# Patient Record
Sex: MALE | Race: WHITE | NOT HISPANIC OR LATINO
[De-identification: names, ages, dates, MRNs, and addresses within clinical notes are randomized per-mention and may not be internally consistent; named-entity substitution may affect disease eponyms.]

---

## 2021-09-28 ENCOUNTER — NON-APPOINTMENT (OUTPATIENT)
Age: 36
End: 2021-09-28

## 2021-09-29 PROBLEM — Z00.00 ENCOUNTER FOR PREVENTIVE HEALTH EXAMINATION: Status: ACTIVE | Noted: 2021-09-29

## 2021-09-30 ENCOUNTER — APPOINTMENT (OUTPATIENT)
Dept: OTOLARYNGOLOGY | Facility: CLINIC | Age: 36
End: 2021-09-30
Payer: COMMERCIAL

## 2021-09-30 VITALS
WEIGHT: 178 LBS | DIASTOLIC BLOOD PRESSURE: 72 MMHG | SYSTOLIC BLOOD PRESSURE: 117 MMHG | HEIGHT: 74 IN | OXYGEN SATURATION: 97 % | TEMPERATURE: 97.8 F | HEART RATE: 72 BPM | BODY MASS INDEX: 22.84 KG/M2

## 2021-09-30 DIAGNOSIS — Z78.9 OTHER SPECIFIED HEALTH STATUS: ICD-10-CM

## 2021-09-30 DIAGNOSIS — I10 ESSENTIAL (PRIMARY) HYPERTENSION: ICD-10-CM

## 2021-09-30 PROCEDURE — 99204 OFFICE O/P NEW MOD 45 MIN: CPT | Mod: 25

## 2021-09-30 PROCEDURE — 31231 NASAL ENDOSCOPY DX: CPT

## 2021-09-30 RX ORDER — LISINOPRIL 10 MG/1
10 TABLET ORAL
Refills: 0 | Status: ACTIVE | COMMUNITY

## 2021-09-30 NOTE — PROCEDURE
[FreeTextEntry6] : We discussed the elevated risk of liberation of viral particles from the nasopharynx if the patient were to be asymptomatically infected with COVID-19; after weighing the risks & benefits the decision was made to proceed. The procedure was performed while wearing appropriate PPE and a camera attached to a video system was used to maximize separation from the patient. The scope was handled appropriately. \par Indication: requirement for exam not possible via anterior rhinoscopy; chronic nasal obstruction\par After verbal consent and the administration of a small amount of an aerosolized phenylephrine/lidocaine mix, examination was performed with a flexible endoscope. Findings:\par Septum: bilat MCS & S-shaped septum\par Mucosa: normal\par Polyposis: not present\par Inferior turbinates: obstructive bilat\par Middle and superior turbinates: normal\par Inferior meatus: unremarkable\par Middle meatus: unremarkable\par Superior meatus: unremarkable\par Speno-ethmoidal recess: unremarkable\par Nasopharynx: unremarkable\par Secretions: unremarkable\par Other findings: none

## 2021-09-30 NOTE — PHYSICAL EXAM
[Nasal Endoscopy Performed] : nasal endoscopy was performed, see procedure section for findings [] : septum deviated bilaterally [de-identified] : R>L pinched midvault; small hump; bilat pos modified Ivan [Normal] : no rashes

## 2021-09-30 NOTE — HISTORY OF PRESENT ILLNESS
[de-identified] : L=R difficulty breathing through the nose for his entire life; hasn't tried any meds for this lately (distant trials of unkn. nasal sprays). Occas sinus infections but not in several years. \par Spring & fall allergies for which he uses OTC antihistamines as needed.

## 2021-09-30 NOTE — ASSESSMENT
[FreeTextEntry1] : Discussed options including turbs, septo/turbs and septo/turbs with either open or closed  grafts; he will consider this during a trial of fluticasone. RTC 1 month

## 2021-09-30 NOTE — REASON FOR VISIT
[Initial Consultation] : an initial consultation for [FreeTextEntry2] : difficulty breathing through the nose

## 2021-09-30 NOTE — CONSULT LETTER
[Dear  ___] : Dear  [unfilled], [Consult Letter:] : I had the pleasure of evaluating your patient, [unfilled]. [Please see my note below.] : Please see my note below. [Consult Closing:] : Thank you very much for allowing me to participate in the care of this patient.  If you have any questions, please do not hesitate to contact me. [Sincerely,] : Sincerely, [FreeTextEntry3] : YOSSI Lopez Jr, MD, FAAOHNS\par Otolaryngologist\par [facility]

## 2021-10-14 ENCOUNTER — TRANSCRIPTION ENCOUNTER (OUTPATIENT)
Age: 36
End: 2021-10-14

## 2021-10-28 ENCOUNTER — APPOINTMENT (OUTPATIENT)
Dept: OTOLARYNGOLOGY | Facility: CLINIC | Age: 36
End: 2021-10-28
Payer: COMMERCIAL

## 2021-10-28 VITALS
TEMPERATURE: 97.8 F | OXYGEN SATURATION: 99 % | BODY MASS INDEX: 22.84 KG/M2 | DIASTOLIC BLOOD PRESSURE: 73 MMHG | HEART RATE: 69 BPM | WEIGHT: 178 LBS | HEIGHT: 74 IN | SYSTOLIC BLOOD PRESSURE: 113 MMHG

## 2021-10-28 PROCEDURE — 99214 OFFICE O/P EST MOD 30 MIN: CPT

## 2021-10-28 NOTE — HISTORY OF PRESENT ILLNESS
[de-identified] : L=R difficulty breathing through the nose for his entire life; hasn't tried any meds for this lately (distant trials of unkn. nasal sprays). Occas sinus infections but not in several years. His breathing is mildly improved on fluticasone. \par Spring & fall allergies for which he uses OTC antihistamines as needed.

## 2021-10-28 NOTE — PHYSICAL EXAM
[] : septum deviated bilaterally [Normal] : no rashes [de-identified] : bilat pinched midvault; small hump; bilat pos modified Ivan

## 2021-10-28 NOTE — ASSESSMENT
[FreeTextEntry1] : We discussed the R&B of various surgical options and settled on a septo/turbs/bilat closed  grafts; he will let us know when he can schedule this. RTC preop.

## 2022-02-24 ENCOUNTER — APPOINTMENT (OUTPATIENT)
Dept: OTOLARYNGOLOGY | Facility: CLINIC | Age: 37
End: 2022-02-24
Payer: COMMERCIAL

## 2022-02-24 VITALS
HEART RATE: 68 BPM | OXYGEN SATURATION: 97 % | WEIGHT: 180 LBS | BODY MASS INDEX: 23.1 KG/M2 | SYSTOLIC BLOOD PRESSURE: 124 MMHG | DIASTOLIC BLOOD PRESSURE: 82 MMHG | TEMPERATURE: 98.2 F | HEIGHT: 74 IN

## 2022-02-24 PROCEDURE — 99215 OFFICE O/P EST HI 40 MIN: CPT

## 2022-02-26 RX ORDER — HYDROCODONE BITARTRATE AND ACETAMINOPHEN 5; 325 MG/1; MG/1
5-325 TABLET ORAL
Qty: 15 | Refills: 0 | Status: ACTIVE | COMMUNITY
Start: 2022-02-26 | End: 1900-01-01

## 2022-02-26 RX ORDER — BACITRACIN 500 [IU]/G
500 OINTMENT TOPICAL
Qty: 1 | Refills: 0 | Status: ACTIVE | COMMUNITY
Start: 2022-02-26 | End: 1900-01-01

## 2022-02-26 NOTE — PHYSICAL EXAM
[] : septum deviated bilaterally [de-identified] : bilat pinched midvault; small hump; bilat pos modified Ivan [Normal] : assessment of respiratory effort is normal

## 2022-02-26 NOTE — ASSESSMENT
[FreeTextEntry1] : Pics taken. The plan will be for bilateral closed  grafts, septo and turbs. \par Fully discussed perioperative care and the risks and benefits of septorhinoplasty, including but not limited to cosmetic deformity, the need for more surgery, septal perforation, nosebleed, loss of sense of smell, poor scarring, infection, and a saddle nose deformity. The functional nature of the procedure was again reviewed. The patient expressed understanding and desires to proceed. An educational perioperative care handout was given.\par [facility] Reference #: [id number]

## 2022-02-26 NOTE — HISTORY OF PRESENT ILLNESS
[de-identified] : L=R difficulty breathing through the nose for his entire life; hasn't tried any meds for this lately (distant trials of unkn. nasal sprays). Occas sinus infections but not in several years. His breathing is mildly improved on fluticasone. \par Spring & fall allergies for which he uses OTC antihistamines as needed. \par He now returns to finalize plans & preop for bilat nasal valve stx repair and for pics.

## 2022-03-28 ENCOUNTER — APPOINTMENT (OUTPATIENT)
Dept: OTOLARYNGOLOGY | Facility: CLINIC | Age: 37
End: 2022-03-28
Payer: COMMERCIAL

## 2022-03-28 PROCEDURE — 99024 POSTOP FOLLOW-UP VISIT: CPT

## 2022-03-28 RX ORDER — BACITRACIN 500 [IU]/G
500 OINTMENT TOPICAL
Qty: 1 | Refills: 0 | Status: ACTIVE | COMMUNITY
Start: 2022-03-28 | End: 1900-01-01

## 2022-03-28 RX ORDER — HYDROCODONE BITARTRATE AND ACETAMINOPHEN 5; 325 MG/1; MG/1
5-325 TABLET ORAL
Qty: 15 | Refills: 0 | Status: ACTIVE | COMMUNITY
Start: 2022-03-28 | End: 1900-01-01

## 2022-03-28 NOTE — ASSESSMENT
[FreeTextEntry1] : Fully discussed perioperative care and the risks and benefits of septorhinoplasty, including but not limited to cosmetic deformity, the need for more surgery, septal perforation, nosebleed, loss of sense of smell, poor scarring, infection, and a saddle nose deformity. The functional nature of the procedure was again reviewed. The patient expressed understanding and desires to proceed. An educational perioperative care handout was given.\par [facility] Reference #: [id number]

## 2022-03-28 NOTE — HISTORY OF PRESENT ILLNESS
[Other Location: e.g. School (Enter Location, City,State)___] : at [unfilled], at the time of the visit. [Medical Office: ([facility])___] : at the medical office located in  [Verbal consent obtained from patient] : the patient, [unfilled] [de-identified] : L=R difficulty breathing through the nose for his entire life; not much effect from a month of fluticasone. Occas sinus infections but not in several years. He now preops for septo/turbs/bilat closed spreaders\par Spring & fall allergies for which he uses OTC antihistamines as needed.

## 2022-03-28 NOTE — PHYSICAL EXAM
[de-identified] : Exam limited d/t lack of in-person appearance\par Constitutional: alert & oriented\par Face/head: normocephalic & atraumatic\par Ears & nose: normal external appearance.\par Neck: no visible lymphadenopathy or masses. Normal apparent range of motion. \par Eyes: no nystagmus or scleral icterus\par Neuro: CN 2-12 grossly intact\par Skin: exposed head & neck skin unremarkable..\par Resp: no dyspnea or coughing\par Psych: normal affect and appropriate linear thinking

## 2022-03-28 NOTE — REASON FOR VISIT
[Pre-Surgical Visit: ____] : pre-surgical visit for [unfilled] [FreeTextEntry2] : preop septo/turbs/bilat closed spreaders

## 2022-04-04 ENCOUNTER — LABORATORY RESULT (OUTPATIENT)
Age: 37
End: 2022-04-04

## 2022-04-11 ENCOUNTER — LABORATORY RESULT (OUTPATIENT)
Age: 37
End: 2022-04-11

## 2022-04-13 ENCOUNTER — TRANSCRIPTION ENCOUNTER (OUTPATIENT)
Age: 37
End: 2022-04-13

## 2022-04-13 ENCOUNTER — APPOINTMENT (OUTPATIENT)
Age: 37
End: 2022-04-13
Payer: COMMERCIAL

## 2022-04-13 PROCEDURE — 30140 RESECT INFERIOR TURBINATE: CPT | Mod: 50

## 2022-04-13 PROCEDURE — 30520 REPAIR OF NASAL SEPTUM: CPT

## 2022-04-13 PROCEDURE — 30465 REPAIR NASAL STENOSIS: CPT

## 2022-04-21 ENCOUNTER — APPOINTMENT (OUTPATIENT)
Dept: OTOLARYNGOLOGY | Facility: CLINIC | Age: 37
End: 2022-04-21
Payer: COMMERCIAL

## 2022-04-21 VITALS
OXYGEN SATURATION: 98 % | TEMPERATURE: 97.7 F | SYSTOLIC BLOOD PRESSURE: 112 MMHG | HEART RATE: 67 BPM | DIASTOLIC BLOOD PRESSURE: 72 MMHG

## 2022-04-21 VITALS — HEIGHT: 74 IN | WEIGHT: 180 LBS | BODY MASS INDEX: 23.1 KG/M2

## 2022-04-21 VITALS — BODY MASS INDEX: 23.1 KG/M2 | HEIGHT: 74 IN | WEIGHT: 180 LBS

## 2022-04-21 PROCEDURE — 99024 POSTOP FOLLOW-UP VISIT: CPT

## 2022-04-21 NOTE — HISTORY OF PRESENT ILLNESS
[de-identified] : s/p septo/turbs/bilat closed spreaders 4/13/21; doing very well w/ already improved breathing.

## 2022-04-21 NOTE — PHYSICAL EXAM
[Normal] : external appearance is normal [de-identified] : debrided; some anterior sloughing removed from the R IT. Septum midline, intact.

## 2022-04-21 NOTE — REASON FOR VISIT
[Post-Operative Visit] : a post-operative visit [FreeTextEntry2] : s/p septo/turbs/bilat closed spreaders

## 2022-05-02 ENCOUNTER — RX RENEWAL (OUTPATIENT)
Age: 37
End: 2022-05-02

## 2022-05-02 RX ORDER — FLUTICASONE PROPIONATE 50 UG/1
50 SPRAY, METERED NASAL
Qty: 1 | Refills: 1 | Status: ACTIVE | COMMUNITY
Start: 2021-09-30 | End: 1900-01-01

## 2022-05-05 ENCOUNTER — APPOINTMENT (OUTPATIENT)
Dept: OTOLARYNGOLOGY | Facility: CLINIC | Age: 37
End: 2022-05-05
Payer: COMMERCIAL

## 2022-05-05 VITALS
HEART RATE: 75 BPM | BODY MASS INDEX: 23.1 KG/M2 | DIASTOLIC BLOOD PRESSURE: 72 MMHG | TEMPERATURE: 97.9 F | OXYGEN SATURATION: 98 % | SYSTOLIC BLOOD PRESSURE: 152 MMHG | HEIGHT: 74 IN | WEIGHT: 180 LBS

## 2022-05-05 DIAGNOSIS — J34.89 OTHER SPECIFIED DISORDERS OF NOSE AND NASAL SINUSES: ICD-10-CM

## 2022-05-05 PROCEDURE — 99024 POSTOP FOLLOW-UP VISIT: CPT

## 2022-05-05 NOTE — PHYSICAL EXAM
[Normal] : no abnormal secretions [de-identified] : debrided; crusting over the R IT. Midline intact septum

## 2022-05-05 NOTE — HISTORY OF PRESENT ILLNESS
[de-identified] : s/p septo/turbs/bilat closed spreaders 4/13/21; doing very well w/ excellent breathing.

## 2022-05-11 ENCOUNTER — NON-APPOINTMENT (OUTPATIENT)
Age: 37
End: 2022-05-11

## 2022-05-19 ENCOUNTER — APPOINTMENT (OUTPATIENT)
Dept: OTOLARYNGOLOGY | Facility: CLINIC | Age: 37
End: 2022-05-19
Payer: COMMERCIAL

## 2022-05-19 VITALS
DIASTOLIC BLOOD PRESSURE: 67 MMHG | SYSTOLIC BLOOD PRESSURE: 114 MMHG | BODY MASS INDEX: 33.13 KG/M2 | WEIGHT: 180 LBS | OXYGEN SATURATION: 97 % | HEART RATE: 63 BPM | HEIGHT: 62 IN | TEMPERATURE: 97.8 F

## 2022-05-19 VITALS — HEIGHT: 74 IN | BODY MASS INDEX: 23.11 KG/M2

## 2022-05-19 DIAGNOSIS — J34.3 HYPERTROPHY OF NASAL TURBINATES: ICD-10-CM

## 2022-05-19 PROCEDURE — 99024 POSTOP FOLLOW-UP VISIT: CPT

## 2022-05-19 NOTE — HISTORY OF PRESENT ILLNESS
[de-identified] : s/p septo/turbs/bilat closed spreaders 4/13/21; doing very well w/ excellent breathing.

## 2022-06-23 ENCOUNTER — APPOINTMENT (OUTPATIENT)
Dept: OTOLARYNGOLOGY | Facility: CLINIC | Age: 37
End: 2022-06-23
Payer: COMMERCIAL

## 2022-06-23 VITALS
HEIGHT: 74 IN | HEART RATE: 74 BPM | DIASTOLIC BLOOD PRESSURE: 70 MMHG | BODY MASS INDEX: 23.1 KG/M2 | SYSTOLIC BLOOD PRESSURE: 107 MMHG | TEMPERATURE: 97.6 F | OXYGEN SATURATION: 95 % | WEIGHT: 180 LBS

## 2022-06-23 DIAGNOSIS — Z87.09 PERSONAL HISTORY OF OTHER DISEASES OF THE RESPIRATORY SYSTEM: ICD-10-CM

## 2022-06-23 PROCEDURE — 99024 POSTOP FOLLOW-UP VISIT: CPT

## 2022-06-23 NOTE — PHYSICAL EXAM
[Nasal Endoscopy Performed] : nasal endoscopy was performed, see procedure section for findings [Normal] : no abnormal secretions [de-identified] : turbs well reduced, Midline intact septum

## 2022-06-23 NOTE — PROCEDURE
[FreeTextEntry6] : Indication: requirement for exam not possible via anterior rhinoscopy; postop concern\par After verbal consent and the administration of an aerosolized phenylephrine/lidocaine mix, examination was performed with a flexible endoscope attached to a video monitoring system. Findings:\par Septum: Midline intact septum\par Mucosa: normal\par Polyposis: not present\par Inferior turbinates: turbs well reduced, \par Middle and superior turbinates: normal\par Inferior meatus: unremarkable\par Middle meatus: unremarkable\par Superior meatus: unremarkable\par Speno-ethmoidal recess: unremarkable\par Nasopharynx: unremarkable\par Secretions: unremarkable\par Other findings: none

## 2022-06-23 NOTE — HISTORY OF PRESENT ILLNESS
[de-identified] : s/p septo/turbs/bilat closed spreaders 4/13/21; he had been doing well with excellent breathing bilaterally until the last few days the L side feels clogged. No other conmplaints

## 2022-10-17 ENCOUNTER — APPOINTMENT (OUTPATIENT)
Dept: OTOLARYNGOLOGY | Facility: CLINIC | Age: 37
End: 2022-10-17

## 2022-10-17 VITALS
TEMPERATURE: 96.9 F | HEART RATE: 64 BPM | SYSTOLIC BLOOD PRESSURE: 129 MMHG | OXYGEN SATURATION: 97 % | WEIGHT: 180 LBS | HEIGHT: 74 IN | BODY MASS INDEX: 23.1 KG/M2 | DIASTOLIC BLOOD PRESSURE: 70 MMHG

## 2022-10-17 DIAGNOSIS — J34.89 OTHER SPECIFIED DISORDERS OF NOSE AND NASAL SINUSES: ICD-10-CM

## 2022-10-17 PROCEDURE — 99213 OFFICE O/P EST LOW 20 MIN: CPT

## 2022-10-17 RX ORDER — MUPIROCIN 20 MG/G
2 OINTMENT TOPICAL
Qty: 1 | Refills: 0 | Status: ACTIVE | COMMUNITY
Start: 2022-10-17 | End: 1900-01-01

## 2022-10-17 NOTE — PHYSICAL EXAM
[Normal] : salivary glands are normal [de-identified] : turbs well reduced, Midline intact septum

## 2022-10-17 NOTE — HISTORY OF PRESENT ILLNESS
[de-identified] : s/p septo/turbs/bilat closed spreaders 4/13/22; he has been doing well with excellent breathing. He returns c/o several episodes of days of irritation and a "pimple" inside his nostril. No known staph probs or skin lesions.